# Patient Record
(demographics unavailable — no encounter records)

---

## 2020-10-06 NOTE — REPVR
PROCEDURE INFORMATION: 

Exam: XR Bilateral Hips with Pelvis when Performed 

Exam date and time: 10/6/2020 4:02 PM 

Age: 60 years old 

Clinical indication: Hip pain; Bilateral; Additional info: Hip/pelvis pain S/P 

fall 



TECHNIQUE: 

Imaging protocol: XR bilateral hips with pelvis when performed. 

Views: 2 views. 



COMPARISON: 

CR Spine. Lumbosacral, complete 6/17/2020 7:34 PM 



FINDINGS: 

Bones/joints: No acute fracture seen. No dislocation. 

Soft tissues: There are left groin region surgical clips. 

Vasculature: Phleboliths in the pelvis. 



IMPRESSION: 

No acute fracture seen. 



Electronically signed by: Isaura Park On 10/06/2020  16:26:26 PM

## 2020-10-06 NOTE — REPVR
PROCEDURE INFORMATION: 

Exam: XR Right Knee 

Exam date and time: 10/6/2020 4:02 PM 

Age: 60 years old 

Clinical indication: Pain; Knee; Right; Additional info: Hip/pelvis pain S/P 

fall 



TECHNIQUE: 

Imaging protocol: XR Right knee. 

Views: 4 or more views. 



COMPARISON: 

No relevant prior studies available. 



FINDINGS: 

Bones/joints: Diffuse osseous demineralization. No acute fracture seen. No 

dislocation. Focal, sclerotic lesion in the lateral femoral condyle, most 

likely a bone island. 

Soft tissues: Normal. 



IMPRESSION: 

No acute fracture seen. 



Electronically signed by: Isaura Park On 10/06/2020  16:28:38 PM

## 2020-10-06 NOTE — REPVR
PROCEDURE INFORMATION: 

Exam: US Duplex Right Lower Extremity Veins, Limited 

Exam date and time: 10/6/2020 3:45 PM 

Age: 60 years old 

Clinical indication: Pain; Leg, upper; Right; Additional info: R/O dvt rle 



TECHNIQUE: 

Imaging protocol: Real-time Duplex ultrasound of the Right Lower Extremity with 

2-D gray scale, color Doppler flow and spectral waveform analysis with image 

documentation. Limited exam was focused on the right lower extremity veins. 



COMPARISON: 

No relevant prior studies available. 



FINDINGS: 

Right deep veins: Unremarkable. The common femoral, femoral, proximal profunda 

femoral and popliteal veins are patent without thrombus. Normal Doppler 

waveforms. Normal compressibility and/or augmentation response.  

Right superficial veins: Unremarkable. Saphenofemoral junction is patent 

without thrombus. 



Soft tissues: Unremarkable. 



IMPRESSION: 

No evidence of deep venous thrombus in the right lower extremity. 



Electronically signed by: Isaura Park On 10/06/2020  16:09:28 PM

## 2020-10-06 NOTE — REPVR
PROCEDURE INFORMATION: 

Exam: CT Abdomen And Pelvis With Contrast 

Exam date and time: 10/6/2020 4:09 PM 

Age: 60 years old 

Clinical indication: Abdominal pain; Generalized; Additional info: R/O pe, 

stopped ac, dizziness, lumbar spine ttp 



TECHNIQUE: 

Imaging protocol: Computed tomography of the abdomen and pelvis with 

intravenous contrast. 

Radiation optimization: All CT scans at this facility use at least one of these 

dose optimization techniques: automated exposure control; mA and/or kV 

adjustment per patient size (includes targeted exams where dose is matched to 

clinical indication); or iterative reconstruction. 

Contrast material: ISOVUE 370; Contrast volume: 100 ml; Contrast route: 

INTRAVENOUS (IV);  



COMPARISON: 

CR HIPS BILAT W-AP PELVIS BILAT 10/6/2020 3:39 PM 



FINDINGS: 

Lungs: Areas of subsegmental atelectasis in the lung bases. 



Liver: Normal. No mass. 

Gallbladder and bile ducts: Cholecystectomy clips in the gallbladder fossa. 

Pancreas: Normal. No ductal dilation. 

Spleen: There is a splenule in the left upper quadrant. Limitations assessing 

the spleen due to the contrast phase which is predominantly arterial. The 

spleen appears diffusely heterogeneous. There is an approximate 2.7 x 2.1 cm 

area of hypodensity in the superior aspect of the spleen seen for example on 

sagittal image 89 of series 503; uncertain as to whether this represents 

artifact, a lesion or an area of contusion. 

Adrenals: Normal. No mass. 

Kidneys and ureters: The kidneys demonstrate punctate nonobstructing calculi.  

No hydronephrosis.  

Stomach and bowel: There is colonic diverticulosis without evidence of 

diverticulitis. No evidence of bowel obstruction.  No bowel wall mucosal 

thickening.  Prior partial gastrectomy.

Appendix: A normal appendix is seen. 



Intraperitoneal space: Unremarkable. No free air. No significant fluid 

collection.  Specifically, no evidence of hemoperitoneum with specific 

attention to the left upper quadrant.

Vasculature: Unremarkable. No abdominal aortic aneurysm or dissection. 

Lymph nodes: Unremarkable. No pathologically enlarged lymph nodes. 

Urinary bladder: Unremarkable as visualized. 

Reproductive: Unremarkable as visualized. 

Bones/joints: No acute fracture seen. Several small sclerotic lesions are seen 

in the bony skeleton which are felt to represent bone islands. Moderate 

degenerative disc disease at L4-L5 and L5-S1.  There is right lateral listhesis 

of L4 on L5.

Soft tissues: Unremarkable. 



IMPRESSION: 

Possible approximate 2.7 cm splenic lesion. Limited assessment of the spleen on 

this study due to arterial phase scanning. Recommend follow-up portal venous 

phase imaging. 



Electronically signed by: Isaura Park On 10/06/2020  16:43:51 PM

## 2020-10-06 NOTE — HPEPDOC
General


Date of Admission


Oct 6, 2020 at 19:41


Date of Service:  Oct 7, 2020


Attending Physician:  REN REILLY MD


Chief Complaint


The patient is a 60-year-old female admitted with a reason for visit of Low Back

Pain Muscle Spasm.





History of Present Illness


Pt is a 59 y/o female who presents to ER with cc of a mechanical fall earlier 

today when she lost her  and fell on her buttocks. She denies losing 

consciousness but denies hitting her head. Pt states that she had a mechnical 

fall where she tripped over a speaker system and fell and hit the back of her 

head and has lost consciousness at that time. Since then she's had pain all 

over, pronounced in the head and neck and back and hips region. She states that 

her pain currently is a 5/10. Of note, Pt is a native [place] speaker and is 

also a very poor historian. She denies CP, SOB, abdominal pain, fever, chills, 

n/v/d.





ROS: All 12 points reviewed. See HPI 





PMHx:


Afib was on eliquis but ran out


DVT


CAD


Asthma


gastric bypass


HTN


DM2


hypothyroidism





Social hx:


Moved here 1 year ago.


Son in  


used to smoke but quit at age 40. Smoked less than half pack per day


Denies ETOH or illicit drug use


Dutch speaking 





Labs: see below





Imaging: 


CTA chest  w/ ctx 


IMPRESSION: 


1. No evidence of pulmonary emboli. 


2. The upper abdomen is evaluated separately on a dedicated exam. The 


unopacified superior aspect of the spleen does appear partially hypodense and 


heterogeneous favoring an underlying lesion. 





CT cervical spine


IMPRESSION: 


No cervical spine fracture seen. 





CT head w/o ctx


IMPRESSION: 


No acute intracranial abnormality seen. 





XR b/l hips w/ pelvis 





IMPRESSION: 


No acute fracture seen. 





Knee R XR 





IMPRESSION: 


No acute fracture seen. 








Home Medications


Scheduled


Levothyroxine Sodium (Levothyroxine Sodium) 25 Mcg Tablet, 25 MCG PO QAM, (Repor

yumiko)


Multivitamins (Thera M Plus Tablet) 1 Each Tablet, 1 TAB PO DAILY, (Reported)


Nutritional Supplement/Fiber (Keto Formula) 227 Gm Liquid, 1 CAP PO DAILY, 

(Reported)


Omega-3 Fatty Acids/Fish Oil (Fish Oil 1,000 mg Capsule) 1 Each Capsule, 1,000 

MG PO DAILY, (Reported)





Allergies


Coded Allergies:  


     propoxyphene (Verified  Allergy, Unknown, 3/24/20)





A-FIB/CHADSVASC


A-FIB History


Current/History of A-Fib/PAF?:  Yes


Current PO Anticoag Therapy:  Yes





Physical Examination


General Exam:  Positive: Alert, Cooperative, No Acute Distress


Eye Exam:  Positive: PERRLA, EOMI, Sclera icteric


ENT Exam:  Positive: Atraumatic, Mucous membr. moist/pink, Tongue Midline, Nares

Patent


Neck Exam:  Positive: Supple; 


   Negative: JVD, thyromegaly, Lymphadenopathy


Chest Exam:  Positive: Clear to auscultation, Normal air movement; 


   Negative: Rales, Rhonchi, Wheezing, Diminished


Heart Exam:  Positive: Rate Normal, Regular Rhythm, Normal S1, Normal S2; 


   Negative: Gallops, Murmurs, Rubs


Abdomen Exam:  Positive: Normal bowel sounds, Soft; 


   Negative: Tenderness, Hepatospenomegaly, Mass


Extremity Exam:  Negative: Clubbing, Cyanosis, Edema


Neuro Exam:  Positive: Normal Speech, Strength at 5/5 X4 ext, Sensation Intact, 

Cranial Nerves 3-12 NL, Reflexes 2+





Vital Signs





Vital Signs








  Date Time  Temp Pulse Resp B/P (MAP) Pulse Ox O2 Delivery O2 Flow Rate FiO2


 


10/6/20 16:56  72  140/72 (94)    





  65  155/82 (106)    





  70  150/85 (106)    


 


10/6/20 16:54   18  94 Room Air  


 


10/6/20 13:37 97.6       











Laboratory Data


Labs 24H


Laboratory Tests 2


10/6/20 15:04: 


Urine Opiates Screen NEGATIVE, Urine Methadone Screen NEGATIVE, Urine 

Barbiturates Screen NEGATIVE, Urine Phencyclidine Screen NEGATIVE, Urine 

Amphetamines Screen NEGATIVE, Urine Benzodiazepines Screen NEGATIVE, Urine 

Cocaine Metabolite Screen NEGATIVE, Urine Cannabinoids Screen NEGATIVE


10/6/20 15:06: 


Urine Color YELLOW, Urine Appearance CLEAR, Urine pH 7.0, Urine Specific Gravity

1.030, Urine Protein NEGATIVE, Urine Glucose (UA) NEGATIVE, Urine Ketones 

NEGATIVE, Urine Blood NEGATIVE, Urine Nitrite NEGATIVE, Urine Bilirubin 

NEGATIVE, Urine Urobilinogen 0.2, Urine Leukocyte Esterase NEGATIVE, Urine WBC 

(Auto) 0, Urine RBC (Auto) 1, Urine Hyaline Casts (Auto) 0, Urine Bacteria 

(Auto) NEGATIVE, Urine Squamous Epithelial Cells 1, Urine Sperm (Auto) 


10/6/20 15:07: 


Immature Granulocyte % (Auto) 0.3, Neutrophils (%) (Auto) 46.6, Lymphocytes (%) 

(Auto) 39.2, Monocytes (%) (Auto) 11.8H, Eosinophils (%) (Auto) 1.6, Basophils 

(%) (Auto) 0.5, Neutrophils # (Auto) 1.7, Lymphocytes # (Auto) 1.5, Monocytes # 

(Auto) 0.4, Eosinophils # (Auto) 0.1, Basophils # (Auto) 0.0, Nucleated Red 

Blood Cells % (auto) 0.0, Prothrombin Time 12.7, Prothromb Time International 

Ratio 0.93, Activated Partial Thromboplast Time 30.7, Total Bilirubin 0.7, 

Direct Bilirubin 0.2, Aspartate Amino Transf (AST/SGOT) 20, Alanine 

Aminotransferase (ALT/SGPT) 23, Alkaline Phosphatase 100, Total Creatine Kinase 

85, Creatine Kinase MB 1.1, Creatine Kinase MB Relative Index 1.29, Troponin I <

0.02, Total Protein 8.2, Albumin 3.8, Albumin/Globulin Ratio 0.9L, Amylase Level

38, Lipase 143, Thyroid Stimulating Hormone (TSH) 1.200, Free Thyroxine 0.87


10/6/20 15:21: 


POC Glucose (Misc Panel) 84, POC Sodium (Misc Panel) 142, POC Potassium (Misc 

Panel) 4.3, POC Chloride (Misc Panel) 103, POC Total CO2 (Misc Panel) 27.0, POC 

Blood Urea Nitrogen (Misc Panel 16, POC Ionized Calcium (Misc Panel) 5.1, POC 

Creatinine (Misc Panel) 0.6, POC Hematocrit (Misc Panel) 35.0L


CBC/BMP


Laboratory Tests


10/6/20 15:07











 Assessment/Plan


This is a 59 y/o female admitted for mechanical fall 2x in the last 2 weeks. Her

imaging workup has all been negative. She will be admitted for obs and will get 

PT eval and treatment for weakness and falls.





Plan / VTE


VTE Prophylaxis Ordered?:  Yes





Plan


Plan


#Mechanical fall 


- no LOC


- CT head, cervical spine WNL


- Fall precautions


- PT/OT eval and treat





#Hx of hypothyroidism


- TSH T4 WNL


- Continue home dose synthroid





#HTN


- pt does not have bp meds 


- Current bp 144/84


- started lisinopril 10mg PO qd


- continue to monitor bp closely 





#Hx of afib


- Pt is not currently on eliquis


- started on eliquis 2.5mg PO BID 


- CHADSVASC score 7.1%


- Started on ASA 81mg PO daily 


- Started Atorvastatin 20mg PO daily 





-DM2


- Pt does not take at home insulin or other meds for DM2 


- SSI + FSBS+ hypoglycemic protocol





#Hx of Asthma


- Pt is not on any meds for Asthma


- No current exacerbation of Asthma


- Albuterol inhaler 2puffs q6h PRN 





DVT ppx: eliquis 2.5mg PO BID


GI ppx: none


IVF: encourage PO 


Diet: 2g sodium diet 





Code status: Full code





GME ATTESTATION


GME ATTESTATION


My faculty preceptor for this patient encounter was physically present during 

the encounter and was fully available. All aspects of the patient interview, e

xamination, medical decision making process, and medical care plan development 

were reviewed and approved by the faculty preceptor. The faculty preceptor is 

aware and concurs with the plan as stated in the body of this note and will 

attest to such by his/her cosignature.





ATTENDING NOTE


IMAI, have independently examined this patient and performed my own 

physical exam, as well as reviewed the documentation and edited where necessary.

I have discussed in detail with the resident / student the findings and plan of 

treatment as documented by the resident / student and edited their note. I agree

with their findings and treatment plan and have edited their documentation. I 

will continue to follow the patient during this hospital stay.





Needs good PCP follow up upon discharge and SW to help with obtaining meds. 


PT/OT to work with her due to mechanical falls 


obtain B12/folate for bastric bypass











Krupa Sesay DO                 Oct 6, 2020 20:29


REN REILLY MD               Oct 7, 2020 04:51

## 2020-10-06 NOTE — REPVR
PROCEDURE INFORMATION: 

Exam: CT Head Without Contrast 

Exam date and time: 10/6/2020 2:55 PM 

Age: 60 years old 

Clinical indication: Pain; Headache; Additional info: Fall 



TECHNIQUE: 

Imaging protocol: Computed tomography of the head without contrast. 

Radiation optimization: All CT scans at this facility use at least one of these 

dose optimization techniques: automated exposure control; mA and/or kV 

adjustment per patient size (includes targeted exams where dose is matched to 

clinical indication); or iterative reconstruction. 



COMPARISON: 

No relevant prior studies available. 



FINDINGS: 

Brain: There is mild cerebellar volume loss. No hemorrhage or edema seen. No 

significant white matter disease. 

Cerebral ventricles: No ventriculomegaly. 

Bones/joints: No acute calvarial fracture seen. Degenerative changes of the 

left temporomandibular joint. 

Paranasal sinuses: Visualized sinuses are unremarkable. No fluid levels. 

Mastoid air cells: Visualized mastoid air cells are well aerated. 

Soft tissues: Unremarkable. 



IMPRESSION: 

No acute intracranial abnormality seen. 



Electronically signed by: Isaura Park On 10/06/2020  15:25:07 PM

## 2020-10-06 NOTE — REPVR
PROCEDURE INFORMATION: 

Exam: CT Angiography Chest With Contrast 

Exam date and time: 10/6/2020 4:09 PM 

Age: 60 years old 

Clinical indication: Chest pain; Additional info: R/O pe, stopped ac, dizziness 



TECHNIQUE: 

Imaging protocol: Computed tomographic angiography of the chest with 

intravenous contrast. 

3D rendering (Not supervised by radiologist): MIP and/or 3D reconstructed 

images were created by the technologist. 

Radiation optimization: All CT scans at this facility use at least one of these 

dose optimization techniques: automated exposure control; mA and/or kV 

adjustment per patient size (includes targeted exams where dose is matched to 

clinical indication); or iterative reconstruction. 

Contrast material: ISOVUE 370; Contrast volume: 100 ml; Contrast route: 

INTRAVENOUS (IV);  

Other technique: COMPARISON MORE: CT ABD/PEL W/IV CONTRAST ONLY 10/6/2020 

4:01:50 PM 



COMPARISON: 

CT ANGIO CHEST 3/24/2020 10:20:32 PM



FINDINGS: 

Pulmonary arteries: Normal. No pulmonary emboli. 

Aorta: Unremarkable. No aortic aneurysm. No aortic dissection. 

Veins: There is reflux of contrast into the inferior vena cava. 



Lungs: The lungs demonstrate areas of subsegmental atelectasis versus scarring, 

in particular lateral right middle lobe. No lobar consolidation seen. 

Pleural space: Unremarkable. No pneumothorax. No pleural effusion. 

Heart: There is a prosthetic mitral valve. 

Lymph nodes: No pathologically enlarged lymph nodes. 



Spleen: The upper abdomen is evaluated separately on a dedicated exam. The 

unopacified superior aspect of the spleen does appear partially hypodense and 

heterogeneous favoring an underlying lesion.  This area does appear stable 

compared to the prior CTA chest study.  There are a couple of punctate regional 

coarse parenchymal calcifications.

Stomach and bowel: Prior partial gastrectomy. 

Bones/joints: No acute fracture seen. Mild-to-moderate thoracic degenerative 

disc disease. 

Soft tissues: Unremarkable. 



IMPRESSION: 

1. No evidence of pulmonary emboli. 

2. The upper abdomen is evaluated separately on a dedicated exam. The 

unopacified superior aspect of the spleen does appear partially hypodense and 

heterogeneous favoring an underlying lesion. 



Electronically signed by: Isaura Park On 10/06/2020  16:55:18 PM

## 2020-10-06 NOTE — REPVR
PROCEDURE INFORMATION: 

Exam: CT Cervical Spine Without Contrast 

Exam date and time: 10/6/2020 2:55 PM 

Age: 60 years old 

Clinical indication: Neck pain; Additional info: Fall 



TECHNIQUE: 

Imaging protocol: Computed tomography images of the cervical spine without 

contrast. 

Radiation optimization: All CT scans at this facility use at least one of these 

dose optimization techniques: automated exposure control; mA and/or kV 

adjustment per patient size (includes targeted exams where dose is matched to 

clinical indication); or iterative reconstruction. 



COMPARISON: 

No relevant prior studies available. 



FINDINGS: 

Vertebrae: Slight degenerative retrolisthesis of C4 on C5 and C5 on C6. No 

acute fracture seen. Disc height loss and spondylosis is moderate from C4-C5 

through C6-C7, mild at C7-T1. 

Discs/Spinal canal/Neural foramina: Disc osteophyte complexes causing moderate 

central spinal canal stenosis the at C5-C6, mild central spinal canal stenosis 

at C6-C7. Uncovertebral and facet arthropathy causing multilevel neural 

foraminal stenoses, on the right in particular at C3-C4 and bilaterally at 

C5-C6 and C6-C7. 



Soft tissues: Unremarkable. 

Lungs: Lung apices are normal. 



IMPRESSION: 

No cervical spine fracture seen. 



Electronically signed by: Isaura Park On 10/06/2020  15:20:23 PM

## 2020-10-07 NOTE — ECGEPIP
[facility] - ED

                                       

                                       Test Date:    2020-10-06

Pat Name:     SYL ALCANTAR            Department:   

Patient ID:   [id number]                 Room:         -

Gender:       Female                   Technician:   [email or web address]

:          1960               Requested By: RADHA PERRY PA-C

Order Number: [id number]     Reading MD:   Karla Bagley

                                 Measurements

Intervals                              Axis          

Rate:         59                       P:            46

WV:           180                      QRS:          -5

QRSD:         93                       T:            29

QT:           429                                    

QTc:          427                                    

                           Interpretive Statements

SINUS BRADYCARDIA WITH OCCASIONAL SUPRAVENTRICULAR PREMATURE COMPLEXES

NSTTW abnormalities

DECREASED RATE 20

Electronically Signed on 10-7-2020 8:29:22 EDT by Karla Bagley

## 2020-10-08 NOTE — DS.PDOC
Discharge Summary


General


Date of Admission


Oct 6, 2020 at 19:41


Date of Discharge


10/07/20





Discharge Summary


PROCEDURES PERFORMED DURING STAY: [None].





DISCHARGE DIAGNOSES:


S/p Mechanical fall x 2 with intractable low back pain and muscle spasm


Medication noncompliance





Secondary Diagnosis


Afib was on eliquis but ran out


DVT


CAD


Asthma


H/o Morbid obesity/ Gastric bypass


HTN


DM2 resolved after gastric bypass


Hypothyroidism





COMPLICATIONS/CHIEF COMPLAINT: Low Back Pain Muscle Spasm.





HOSPITAL COURSE: Pt is a 59 y/o female who presents to ER with cc of a 

mechanical fall earlier today when she lost her balance and fell on her 

buttocks. She denies losing consciousness but denies hitting her head. Pt states

 that she had a mechnical fall several days ago where she tripped over a speaker

 system and fell and hit the back of her head and had lost consciousness at that

 time. Since then she's had pain all over, pronounced in the head and neck and 

back and hips region and was having difficulty in ambulating and fell again on 

the day of admission. Patient moved up here about a year ago with his son who is

 active duty and has not seen any PMD yet. She ran out of her medications 1 mo

nth ago. She was admitted for pain control. 





Mechanical fall x 2 with intractable pain and muscle spasm


Now resolved. 


no LOC


CT head, cervical spine WNL


tylenol scheduled helped. 





Hypothyroidism


continue synthroid





HTN


started lisinopril





Paroxysmal A fib


restarted Eliquis





H/O DVT


started on eliquis





Hx of Asthma


Pt is not on any meds for Asthma





DISCHARGE MEDICATIONS: Please see below.


 


ALLERGIES: Please see below.





PHYSICAL EXAMINATION ON DISCHARGE:


VITAL SIGNS: Please see below.


General Exam:  Positive: Alert, Cooperative, No Acute Distress


Eye Exam:  Positive: PERRLA, EOMI, Sclera icteric


ENT Exam:  Positive: Atraumatic, Mucous membr. moist/pink, Tongue Midline, Nares

 Patent


Neck Exam:  Positive: Supple; 


   Negative: JVD, thyromegaly, Lymphadenopathy


Chest Exam:  Positive: Clear to auscultation, Normal air movement; 


   Negative: Rales, Rhonchi, Wheezing, Diminished


Heart Exam:  Positive: Rate Normal, Regular Rhythm, Normal S1, Normal S2; 


   Negative: Gallops, Murmurs, Rubs


Abdomen Exam:  Positive: Normal bowel sounds, Soft; 


   Negative: Tenderness, Hepatosplenomegaly, Mass


Extremity Exam:  Negative: Clubbing, Cyanosis, Edema


Neuro Exam:  Positive: Normal Speech, Strength at 5/5 X4 ext, Sensation Intact, 

Cranial Nerves 3-12 NL, Reflexes 2+





LABORATORY DATA: Please see below.





IMAGING: 


CTA chest  w/ ctx 


IMPRESSION: 


1. No evidence of pulmonary emboli. 


2. The upper abdomen is evaluated separately on a dedicated exam. The 


unopacified superior aspect of the spleen does appear partially hypodense and 


heterogeneous favoring an underlying lesion. 





CT cervical spine


IMPRESSION: 


No cervical spine fracture seen. 





CT head w/o ctx


IMPRESSION: 


No acute intracranial abnormality seen. 





XR b/l hips w/ pelvis 


IMPRESSION: 


No acute fracture seen. 





Knee R XR 





IMPRESSION: 


No acute fracture seen. 





ACTIVITY: [As tolerated].





DIET: As tolerated





DISPOSITION: 01 Home, Self-Care.





DISCHARGE INSTRUCTIONS:


Follow up with new PMD Dr Chavez in 1 week





DISCHARGE CONDITION: [Stable].





TIME SPENT ON DISCHARGE: 35 minutes.





Vital Signs/I&Os





Vital Signs








  Date Time  Temp Pulse Resp B/P (MAP) Pulse Ox O2 Delivery O2 Flow Rate FiO2


 


10/7/20 09:22    138/83    


 


10/7/20 06:00 97.9 59 18  99 Room Air  














I&O- Last 24 Hours up to 6 AM 


 


 10/8/20





 05:59


 


Intake Total 120 ml


 


Balance 120 ml











Discharge Medications


Scheduled


Apixaban (Eliquis) 5 Mg Tablet, 1 TAB PO BID


Levothyroxine Sodium (Levothyroxine Sodium) 25 Mcg Tablet, 25 MCG PO QAM


Lisinopril (Lisinopril) 10 Mg Tablet, 10 MG PO DAILY


Omega-3 Fatty Acids/Fish Oil (Fish Oil 1,000 mg Capsule) 1 Each Capsule, 1,000 

MG PO DAILY





Scheduled PRN


Acetaminophen (Acetaminophen) 500 Mg Tablet, 1,000 MG PO BIDP PRN for PAIN





Allergies


Coded Allergies:  


     propoxyphene (Verified  Allergy, Unknown, 3/24/20)











MASON LE MD                    Oct 8, 2020 12:40

## 2021-03-09 NOTE — ECWPNPC
PATIENT NAME: SYL ALCANTAR

: 1960

GENDER: FEMALE

MRN: [id number]

VISIT DATE: 2021

DISCHARGE DATE: 21 0958

VISIT LOCKED DATE TIME: 

PHYSICIAN: PAOLA WILKES 

PHYSICIAN PAGER NO: ACTIVE

RESOURCE: PAOLA WILKES 

 

           

           

REASON FOR APPOINTMENT

           

          1. LOW BACK

           

HISTORY OF PRESENT ILLNESS

           

      GENERAL:

           61-YEAR-OLD FEMALE IN FOR INITIAL PAIN CONSULT. SHE RATES HER

          PAIN CURRENTLY AT A 9 OUT OF 10 AND DESCRIBES IT AS BURNING,

          INTERMITTENT, SHARP, STABBING, AND CRAMPING. THE PAIN HAS BEEN

          PRESENT FOR SEVERAL YEARS. PATIENT FEELS MEDICATIONS ARE HELPFUL.

           

      FALL RISK SCREENING:

      SCREENING

          : ONE FALL WITH INJURY IN THE PAST YEAR PATIENT STATES SHE WAS

          SEEN AT [facility] AND TREATED FOR A FALL IN 2020..

           

      PAIN SCREENING:

      PATIENT HAS A COMPLAINT OF ACUTE OR CHRONIC PAIN

           

           

          :YES

          LOCATION OF PAIN:LOW BACK, LEG(S)

          INTENSITY OF PAIN (SCALE OF 1 TO 10):9

          WHAT DOES YOUR PAIN FEEL LIKE:BURNING, INTERMITTENT, SHARP,

          STABBING, OTHER CRAMPING

          DURATION:STEADY, INTERMITTENT

          PAIN IS INCREASED BY:ACTIVITIES, PROLONGED STANDING

          PAIN IS DECREASED BY:USE OF PAIN MEDICATIONS, OTHERS

           

      NURSING NOTE:

           - - -.

           

      PAIN CENTER INTAKE QUESTIONS:

      DO YOU HAVE A HISTORY OF MRSA?

           

           

          :NO

           

      DO YOU TAKE A BLOOD THINNERS?

           

           

          :YES ELOQUIS

           

      DO YOU HAVE ANY BLEEDING DISORDERS?

           

           

          :YES SICKLE CELL ANEMIA

           

      ANY NEW NUMBNESS OR WEAKNESS IN YOUR LEGS OR ARMS?

           

           

          :YES LEFT SIDED WEAKNESS

           

      ANY PACEMAKER,DEFIBRILLATOR, OR DORSAL COLUMN

          STIMULATOR?

           

           

          :NO

           

      DO YOU HAVE ANY RASHES OR OPEN SORES?

           

           

          :NO

           

      ARE YOU ALLERGIC TO IV DYE?

           

           

          :NO

           

      ARE YOU DIABETIC?

           

           

          :YES TYPE II

           

      ANY NEW PROBLEMS WITH YOUR MEDICATIONS?

           

           

          :NO

           

      HAVE YOU RECEIVED A VACCINE IN THE PAST 30 DAYS?

           

           

          :NO

           

      DO YOU PLAN TO RECEIVE A VACCINE IN THE NEXT 21

          DAYS?

           

           

          :YES

          IF SO WHAT VACCINE AND WHEN?  SCHEDULED FOR 1ST

          COVID VACCINATION.

           

      DO YOU NEED ANY PRESCRIPTION?

           

           

          :NO

           

      DO YOU TAKE ANY IMMUNOSUPPRESSIVE MEDICATIONS?

           

           

          :NO

           

      DO YOU HAVE ANY KIDNEY OR LIVER DISEASE?

           

           

          :NO

           

      IS THERE A CHANCE YOU COULD BE PREGNANT?

           

           

          :NO

           

      ARE YOU BREAST FEEDING?

           

           

          :NO

           

CURRENT MEDICATIONS

           

          TAKING OMEPRAZOLE 40 MG CAPSULE DELAYED RELEASE 1 CAPSULE 30

          MINUTES BEFORE MORNING MEAL ORALLY ONCE A DAY

          TAKING AMOXICILLIN-POT CLAVULANATE 875-125 MG TABLET 1 TABLET

          ORALLY EVERY 12 HRS

          TAKING BUSPIRONE HCL 5 MG TABLET 1 TABLET ORALLY TWICE A DAY

          TAKING LEVOFLOXACIN 750 MG TABLET 1 TABLET ORALLY ONCE A DAY,

          NOTES: UNSURE OF DOSAGE

          TAKING PERCOCET 5-325 MG TABLET 1 TABLET AS NEEDED ORALLY EVERY 6

          HRS

          TAKING FLUOXETINE HCL (PMDD) 20 MG TABLET 1 TABLET ORALLY ONCE A

          DAY

          TAKING OLANZAPINE 5 MG TABLET 1 TABLET ORALLY ONCE A DAY

          TAKING ALPRAZOLAM 1 MG TABLET 1 TABLET AS NEEDED FOR ANXIETY

          ORALLY ONCE A DAY

          TAKING ELIQUIS 5 MG TABLET 1 TABLET ORALLY TWICE A DAY

          TAKING CARVEDILOL 3.125 MG TABLET 1 TABLET WITH FOOD ORALLY TWICE

          A DAY

          TAKING CLOPIDOGREL BISULFATE 75 MG TABLET 1 TABLET ORALLY ONCE A

          DAY

          TAKING VITAMIN D3 25 MCG (1000 UT) TABLET 1 TABLET ORALLY ONCE A

          DAY

          TAKING LINZESS 145 MCG CAPSULE 1 CAPSULE AT LEAST 30 MINUTES

          BEFORE THE FIRST MEAL OF THE DAY ON AN EMPTY STOMACH ORALLY ONCE

          A DAY

          NOT-TAKING PERCOCET 5-325 MG TABLET 1 TABLET AS NEEDED FOR LOWER

          BACK PAIN ORALLY EVERY 12 HRS

          NOT-TAKING NITROGLYCERIN 0.4 MG TABLET SUBLINGUAL AS DIRECTED

          SUBLINGUAL

          NOT-TAKING LEVOTHYROXINE SODIUM 25 MCG TABLET 1 TABLET IN THE

          MORNING ON AN EMPTY STOMACH ORALLY ONCE A DAY

          MEDICATION LIST REVIEWED AND RECONCILED WITH THE PATIENT

           

PAST MEDICAL HISTORY

           

          LOW BACK PAIN

          MITRAL VALVE

          GASTRIC BYPASS 

          UMBILICAL HERNIA 

          CHOLECYCSTITIS 

          SICKLE CELL ANEMIA

          ASTHMA

          DIABETES MELLITUS TYPE II

           

ALLERGIES

           

          DARVON: HEART PALPATATIONS - SIDE EFFECTS

           

SURGICAL HISTORY

           

          MITRAL VALVE

           

SOCIAL HISTORY

           

          GENERAL:

           

          TOBACCO USE

          ARE YOU A:FORMER SMOKER

           

           

          LATEX QUESTIONNAIRE

          LATEX ALLERGY : HAVE YOU EVER DEVELOPED ANY TYPE OF REACTION

          AFTER HANDLING LATEX PRODUCTS SUCH AS RUBBER GLOVES, CONDOMS,

          DIAPHRAGMS, BALLOONS, SOCKS, OR UNDERWEAR?NO

          LATEX ALLERGY : HAVE YOU EVER DEVELOPED ANY TYPE OF REACTION

          DURING OR AFTER DENTAL APPOINTMENT, VAGINAL/RECTAL EXAMINATION,

          SURGICAL PROCEDURE, OR ANY OTHER EXPOSURE?NO

          LATEX RISK : HAVE YOU EVER HAD ANY DIFFICULTY BREATHING OR HIVES

          AFTER EATING OR HANDLING ANY FRUITS, OR VEGETABLES; SUCH AS KIWI,

          BANANAS, STONE FRUITS, OR CHESTNUTSNO

          LATEX RISK : DO YOU HAVE A PREVIOUS PERSONAL HISTORY OF MORE THAN

          NINE SURGERIES, SPINA BIFIDA, OR REPEATED CATHERIZATIONS? NO

          LATEX RISK : ARE YOU FREQUENTLY EXPOSED TO LATEX PRODUCTS IN YOUR

          OCCUPATION?NO

          DATE ASKED : 2021

           

           

          ALCOHOL USE: NO.

           

           

          ALCOHOL SCREENING

          DID YOU HAVE A DRINK CONTAINING ALCOHOL IN THE PAST YEAR?NO

          POINTS0

          INTERPRETATIONNEGATIVE

           

           

          RECREATIONAL DRUG USE

          DRUG USE?NO

           

           

          CAFFEINE

          CAFFEINE USE?NO

           

           

          LANGUAGE

          LANGUAGES SPOKEN:Macedonian ENGLISH

           

           

          LEARNING BARRIERS / SPECIAL NEEDS

          BARRIERS TO LEARNING?NO

          HEARING IMPAIRED?YES

          VISION IMPAIRED?YES

          :CORRECTIVE LENSES

          COGNITIVELY IMPAIRED?NO

          READINESS TO LEARN?YES

          LEARNING PREFERENCES?NO

          LEARNING CAPABILITIES PRESENT?YES

          EMOTIONAL BARRIERS?NO

          SPECIAL DEVICES?NO

           NEEDED?YES

           

HOSPITALIZATION/MAJOR DIAGNOSTIC PROCEDURE

           

          SURGERY

          HTN/[facility] 2020

          STROKE 1998

           

REVIEW OF SYSTEMS

           

      CONSTITUTIONAL:

           

          ANY RECENT FEVER    NO . CHILLS    NO . WEIGHT CHANGE OF UNKNOWN

          REASONS    NO .

           

      MUSCULOSKELETAL:

           

          ANY UNUSUAL JOINT PAIN OR SWELLING NOT MENTIONED    NO . SYSTEMIC

          LUPUS    NO . ANY NEUROMUSCULAR DISORDER NOT MENTIONED    NO .

          LYME DISEASE    NO .

           

      GASTROENTEROLOGY:

           

          ANY NEW CHANGE IN BOWEL CONTROL?    NO . HISTORY OF LIVER

          DISORDER NOT MENTIONED    NO . HISTORY OF UNUSUAL ABDOMINAL PAIN

          OR CRAMPING NOT MENTIONED    NO . NO CONSTIPATION.

           

      GENITOURINARY:

           

          ANY NEW CHANGE IN BLADDER CONTROL?    NO . ANY RENAL/KIDNEY

          CONDITON NOT MENTIONED    NO .

           

      NEUROLOGY:

           

          HISTORY OF TBI NOT MENTIONED    NO . OTHER NEW NUMBNESS OR PAIN

          PATTERNS NOT MENTIONED    NO . NEW ONSET DIZZINESS OR

          NEUROLOGICAL CHANGES NOT MENTIONED    NO . HISTORY OF SEVERE

          HEADACHES NOT MENTIONED    NO . HISTORY OF STROKE OR NEUROLOGICAL

          DISORDER NOT MENTIONED    NO .

           

      CARDIOLOGY:

           

          HEART SURGERY    NO . CONGESTIVE HEART FAILURE/FLUID OVERLOAD NOT

          MENTIONED    NO . HISTORY OF CHEST PAIN,IRREGULAR HEART BEAT NOT

          MENTIONED    NO .

           

      RESPIRATORY:

           

          SHORTNESS OF BREATH ON EXERTION, WHEEZES, UNUSUAL COUGH NOT

          MENTIONED    NO .

           

      ENDOCRINOLOGY:

           

          ADRENAL GLAND OR THYROID DISORDERS NOT MENTIONED    NO . UNUSUAL

          URINATION, DIZZINESS OR LETHARGY NOT MENTIONED    NO .

           

VITAL SIGNS

           

          .4 LBS, HT 67 IN, BMI 31.07 INDEX, /72 MM HG, HR 65

          /MIN, RR 18 /MIN, TEMP 98.0 F, OXYGEN SAT % 100%, SAFE IN ENV?

          (Y/N) YES, NA INITIALS SC 08:39, REVIEWED BY: YUE QUAN MA.

           

EXAMINATION

           

      GENERAL EXAMINATION:

          GENERALNO ACUTE DISTRESS, WELL NOURISHED AND HYDRATED.

           

          PSYCHAPPROPRIATE MOOD AND AFFECT .

           

          LUNGS:CLEAR TO AUSCULTATION BILATERALLY, NO WHEEZES, RHONCHI,

          RALES.

           

          HEART:NO MURMURS, REGULAR RATE AND RHYTHM.

           

          BACK:POINT TENDER BILATERAL LUMBAR SPINE, STARTING SKIN SHOWS NO

          ERYTHEMA, ECCHYMOSIS, INCREASED WARMTH, AND/OR SKIN ERUPTIONS

          NOTED. .

           

ASSESSMENTS

           

          LOW BACK PAIN, UNSPECIFIED BACK PAIN LATERALITY, UNSPECIFIED

          CHRONICITY, UNSPECIFIED WHETHER SCIATICA PRESENT - M54.5

          (PRIMARY)

           

          LONG TERM (CURRENT) USE OF OPIATE ANALGESIC - Z79.891

           

TREATMENT

           

      LOW BACK PAIN, UNSPECIFIED BACK PAIN LATERALITY,

          UNSPECIFIED CHRONICITY, UNSPECIFIED WHETHER SCIATICA PRESENT

          START OXYCODONE HCL TABLET, 5 MG, 1 TABLET AS NEEDED, ORALLY,

          EVERY 12 HRS AS NEEDED MDD 2, 30 DAYS, 60

          LAB: URINE TEST GROUP

           

          SURINDER QUAN 3/4/2021 9:46:58 AM > LAST DOSE PERCOCET 2021

           

           

          [facility] MRI SPINE, [facility] WITHOUT [id number]

          NOTES: 61-YEAR-OLD FEMALE IN FOR INITIAL PAIN CONSULT. GIVEN

          PRESENTING SYMPTOMS AND RESULTS PHYSICAL EXAMINATION RECOMMEND

          GETTING AN UPDATED MRI AND STARTING MEDICATIONS WITH FOLLOW-UP

          STATUS POST MRI. PATIENT HAS EXPRESSED UNDERSTANDING OF AND WAS

          IN AGREEMENT WITH TREATMENT PLAN. GIVEN TIME TO ASK QUESTIONS AND

          EXPRESS CONCERNS.

           

PROCEDURE CODES

           

           ESTABILISHED PATIENT [facility] CHARGE

           

DISPOSITION & COMMUNICATION

           

FOLLOW UP

           

          POST IMAGING (REASON: OPEN MRI LUMBAR SPINE W/O CONTRAST )

           

 

ELECTRONICALLY SIGNED BY MONA EM ON

          2021 AT 02:00 PM EST

           

           

           

 

DISCLAIMER :

THIS IS A VISIT SUMMARY EXTRACTED FROM THE [employer] CHART.

IT IS NOT A COPY OF THE [employer] PROGRESS NOTE.

LUCY

## 2021-03-18 NOTE — REP
INDICATION:

HERNIA.



COMPARISON:

10/06/2020



TECHNIQUE:

Axial contrast-enhanced images from the lung bases to the pubic symphysis using oral

and 100 cc Isovue 370 intravenous contrast material.  Coronal and sagittal

reformations obtained.



This CT examination was performed using the following dose reduction techniques:

Automated exposure control, adjustment of mA and/or kv according to the patient's

size, and the use of iterative reconstruction technique.



FINDINGS:

Liver, spleen, pancreas, bilateral adrenal glands and kidneys are normal.



Evidence for prior gastric bypass surgery and cholecystectomy.



The enteric system including stomach, small, and large bowel appears normal.  No

evidence for obstruction or acute inflammatory process.  Normal terminal ileum and

appendix are identified in the right lower quadrant.  Colonic and sigmoid

diverticulosis noted without acute diverticulitis.



A small fat containing ventral/periumbilical hernia is suggested measuring less than 2

cm.



Pelvis demonstrates normal bladder and age-appropriate uterus/adnexa.



No ascites.  No free air.  No intraperitoneal or retroperitoneal adenopathy.

Abdominal aorta and vasculature appear normal.  Musculoskeletal structures are intact

and without acute osseous abnormality.



IMPRESSION:

No acute abdominopelvic pathology appreciated.

Diverticulosis without acute diverticulitis.

Small subtle midline periumbilical hernia measuring less than 2 cm.





<Electronically signed by Nikhil Sharp > 03/18/21 0816

## 2021-04-10 NOTE — ECWPNPC
PATIENT NAME: SYL ALACNTAR

: 1960

GENDER: FEMALE

MRN: [id number]

VISIT DATE: 2021

DISCHARGE DATE: 21 1137

VISIT LOCKED DATE TIME: 

PHYSICIAN: PAOLA WILKES 

PHYSICIAN PAGER NO: ACTIVE

RESOURCE: PAOLA WILKES 

 

           

           

REASON FOR APPOINTMENT

           

          1. MRI REVIEW

           

HISTORY OF PRESENT ILLNESS

           

      DEPRESSION SCREENING:

      PHQ-9

           

           

          LITTLE INTEREST OR PLEASURE IN DOING THINGSNEARLY EVERY DAY

          FEELING DOWN, DEPRESSED, OR HOPELESSSEVERAL DAYS

          TROUBLE FALLING OR STAYING ASLEEP, OR SLEEPING TOO MUCHNEARLY

          EVERY DAY

          FEELING TIRED OR HAVING LITTLE ENERGYNEARLY EVERY DAY

          POOR APPETITE OR OVEREATING NEARLY EVERY DAY

          FEELING BAD ABOUT YOURSELF-OR THAT YOU ARE A FAILURE OR HAVE LET

          YOURSELF OR YOUR FAMILY DOWN NEARLY EVERY DAY

          TROUBLE CONCENTRATING ON THINGS, SUCH AS READING THE NEWSPAPER OR

          WATCHING TELEVISION NEARLY EVERY DAY

          MOVING OR SPEAKING SO SLOWLY THAT OTHER PEOPLE COULD HAVE

          NOTICED. OR THE OPPOSITE- BEING SO FIDGETY OR RESTLESS THAT YOU

          HAVE BEEN MOVING AROUND A LOT MORE THAN USUALNOT AT ALL

          THOUGHTS THAT YOU WOULD BE BETTER OFF DEAD, OR OF HURTING

          YOURSELF IN SOME WAY?NOT AT ALL

          TOTAL SCORE:19

          INTERPRETATIONMODERATELY SEVERE DEPRESSION

           

      PHQ-2 (2015 EDITION)

           

           

          LITTLE INTEREST OR PLEASURE IN DOING THINGS?NEARLY EVERY DAY

          FEELING DOWN, DEPRESSED, OR HOPELESS?NEARLY EVERY DAY

          TOTAL SCORE6

           

           61-YEAR-OLD FEMALE IN FOR CHRONIC PAIN FOLLOW-UP. SHE RATES HER

          PAIN CURRENTLY AT AN 8 OUT OF 10. PATIENT WAS STARTED ON

          OXYCODONE AT LAST CLINIC VISIT AND IT WAS NOTED TODAY THAT SHE

          HAD BEEN OVERTAKING HER MEDICATION.

           

      GENERAL:

           -.

           

      FALL RISK SCREENING:

      SCREENING

          : NO FALLS REPORTED IN THE LAST YEAR.

           

      PAIN SCREENING:

      PATIENT HAS A COMPLAINT OF ACUTE OR CHRONIC PAIN

           

           

          :YES

          LOCATION OF PAIN:HEAD, LOW BACK, LEG(S)

          INTENSITY OF PAIN (SCALE OF 1 TO 10):8

          WHAT DOES YOUR PAIN FEEL LIKE:ACHING, SHARP, STABBING, THROBBING,

          SHOOTING

          DURATION:CONTINOUS, CONSTANT

          PAIN IS INCREASED BY:ACTIVITIES

          PAIN IS DECREASED BY:USE OF PAIN MEDICATIONS

           

      NURSING NOTE:

           -.

           

      PAIN CENTER INTAKE QUESTIONS:

      DO YOU HAVE A HISTORY OF MRSA?

           

           

          :NO

           

      DO YOU TAKE A BLOOD THINNERS?

           

           

          :NO

           

      DO YOU HAVE ANY BLEEDING DISORDERS?

           

           

          :NO

           

      ANY NEW NUMBNESS OR WEAKNESS IN YOUR LEGS OR ARMS?

           

           

          :YES LEFT LEG AND LEFT HAND

           

      ANY PACEMAKER,DEFIBRILLATOR, OR DORSAL COLUMN

          STIMULATOR?

           

           

          :NO

           

      DO YOU HAVE ANY RASHES OR OPEN SORES?

           

           

          :NO

           

      ARE YOU ALLERGIC TO IV DYE?

           

           

          :NO

           

      ARE YOU DIABETIC?

           

           

          :YES

           

      ANY NEW PROBLEMS WITH YOUR MEDICATIONS?

           

           

          :YES PT TAKING OXYCODONE 2 TABS TID FOR IT TO WORK

           

      HAVE YOU RECEIVED A VACCINE IN THE PAST 30 DAYS?

           

           

          :NO

           

      DO YOU PLAN TO RECEIVE A VACCINE IN THE NEXT 21

          DAYS?

           

           

          :YES

          IF SO WHAT VACCINE AND WHEN? COVID VACCINE NEXT MONTH

           

      DO YOU NEED ANY PRESCRIPTION?

           

           

          :YES PT ASKING FOR PERCOSET 5/325

           

      DO YOU TAKE ANY IMMUNOSUPPRESSIVE MEDICATIONS?

           

           

          :NO

           

      DO YOU HAVE ANY KIDNEY OR LIVER DISEASE?

           

           

          :NO

           

      IS THERE A CHANCE YOU COULD BE PREGNANT?

           

           

          :NO

           

      ARE YOU BREAST FEEDING?

           

           

          :NO

           

CURRENT MEDICATIONS

           

          TAKING ALPRAZOLAM 1 MG TABLET 1 TABLET AS NEEDED FOR ANXIETY

          ORALLY ONCE A DAY

          TAKING VITAMIN D3 25 MCG (1000 UT) TABLET 1 TABLET ORALLY ONCE A

          DAY

          TAKING LINZESS 145 MCG CAPSULE 1 CAPSULE AT LEAST 30 MINUTES

          BEFORE THE FIRST MEAL OF THE DAY ON AN EMPTY STOMACH ORALLY ONCE

          A DAY

          TAKING OXYCODONE HCL 5 MG TABLET 1 TABLET AS NEEDED ORALLY EVERY

          12 HRS AS NEEDED MDD 2

          TAKING OLANZAPINE 5 MG TABLET 1 TABLET ORALLY ONCE A DAY

          NOT-TAKING OMEPRAZOLE 40 MG CAPSULE DELAYED RELEASE 1 CAPSULE 30

          MINUTES BEFORE MORNING MEAL ORALLY ONCE A DAY

          NOT-TAKING AMOXICILLIN-POT CLAVULANATE 875-125 MG TABLET 1 TABLET

          ORALLY EVERY 12 HRS

          NOT-TAKING BUSPIRONE HCL 5 MG TABLET 1 TABLET ORALLY TWICE A DAY

          NOT-TAKING LEVOFLOXACIN 750 MG TABLET 1 TABLET ORALLY ONCE A DAY,

          NOTES: UNSURE OF DOSAGE

          NOT-TAKING PERCOCET 5-325 MG TABLET 1 TABLET AS NEEDED ORALLY

          EVERY 6 HRS

          NOT-TAKING FLUOXETINE HCL (PMDD) 20 MG TABLET 1 TABLET ORALLY

          ONCE A DAY

          NOT-TAKING ELIQUIS 5 MG TABLET 1 TABLET ORALLY TWICE A DAY

          NOT-TAKING CLOPIDOGREL BISULFATE 75 MG TABLET 1 TABLET ORALLY

          ONCE A DAY

          NOT-TAKING CARVEDILOL 3.125 MG TABLET 1 TABLET WITH FOOD ORALLY

          TWICE A DAY

          NOT-TAKING PERCOCET 5-325 MG TABLET 1 TABLET AS NEEDED FOR LOWER

          BACK PAIN ORALLY EVERY 12 HRS

          NOT-TAKING NITROGLYCERIN 0.4 MG TABLET SUBLINGUAL AS DIRECTED

          SUBLINGUAL

          NOT-TAKING LEVOTHYROXINE SODIUM 25 MCG TABLET 1 TABLET IN THE

          MORNING ON AN EMPTY STOMACH ORALLY ONCE A DAY

          MEDICATION LIST REVIEWED AND RECONCILED WITH THE PATIENT

           

PAST MEDICAL HISTORY

           

          LOW BACK PAIN

          MITRAL VALVE

          GASTRIC BYPASS 

          UMBILICAL HERNIA 2015

          CHOLECYCSTITIS 

          SICKLE CELL ANEMIA

          ASTHMA

          DIABETES MELLITUS TYPE II

           

ALLERGIES

           

          DARVON: HEART PALPATATIONS - SIDE EFFECTS

           

SOCIAL HISTORY

           

          GENERAL:

           

          TOBACCO USE

          ARE YOU A:FORMER SMOKER

           

           

          LATEX QUESTIONNAIRE

          LATEX ALLERGY : HAVE YOU EVER DEVELOPED ANY TYPE OF REACTION

          AFTER HANDLING LATEX PRODUCTS SUCH AS RUBBER GLOVES, CONDOMS,

          DIAPHRAGMS, BALLOONS, SOCKS, OR UNDERWEAR?NO

          LATEX ALLERGY : HAVE YOU EVER DEVELOPED ANY TYPE OF REACTION

          DURING OR AFTER DENTAL APPOINTMENT, VAGINAL/RECTAL EXAMINATION,

          SURGICAL PROCEDURE, OR ANY OTHER EXPOSURE?NO

          DATE ASKED : 2021

          LATEX RISK : HAVE YOU EVER HAD ANY DIFFICULTY BREATHING OR HIVES

          AFTER EATING OR HANDLING ANY FRUITS, OR VEGETABLES; SUCH AS KIWI,

          BANANAS, STONE FRUITS, OR CHESTNUTSNO

          LATEX RISK : DO YOU HAVE A PREVIOUS PERSONAL HISTORY OF MORE THAN

          NINE SURGERIES, SPINA BIFIDA, OR REPEATED CATHERIZATIONS? NO

          LATEX RISK : ARE YOU FREQUENTLY EXPOSED TO LATEX PRODUCTS IN YOUR

          OCCUPATION?NO

           

           

          ALCOHOL USE: NO.

           

           

          ALCOHOL SCREENING

          DID YOU HAVE A DRINK CONTAINING ALCOHOL IN THE PAST YEAR?NO

          POINTS0

          INTERPRETATIONNEGATIVE

           

           

          RECREATIONAL DRUG USE

          DRUG USE?NO

           

           

          CAFFEINE

          CAFFEINE USE?NO

           

           

          LANGUAGE

          LANGUAGES SPOKEN:Amharic ENGLISH

           

           

          LEARNING BARRIERS / SPECIAL NEEDS

          BARRIERS TO LEARNING?NO

          HEARING IMPAIRED?YES

          VISION IMPAIRED?YES

          COGNITIVELY IMPAIRED?NO

          :CORRECTIVE LENSES

          READINESS TO LEARN?YES

          LEARNING PREFERENCES?NO

          LEARNING CAPABILITIES PRESENT?YES

          EMOTIONAL BARRIERS?NO

          SPECIAL DEVICES?NO

           NEEDED?YES

           

REVIEW OF SYSTEMS

           

      CONSTITUTIONAL:

           

          ANY RECENT FEVER    NO . CHILLS    NO . WEIGHT CHANGE OF UNKNOWN

          REASONS    NO .

           

      GASTROENTEROLOGY:

           

          NEW UNEXPLAINABLE CHANGES IN BOWEL CONTROL    NO . CONSTIPATION  

           NO .

           

      GENITOURINARY:

           

          ANY NEW CHANGE IN BLADDER CONTROL?    NO .

           

      NEUROLOGY:

           

          NEW ONSET DIZZINESS OR NEUROLOGICAL CHANGES NOT MENTIONED    NO .

          NEW NUMBNESS OR PAIN PATTERNS NOT MENTIONED AND PERTINENT TO

          TODAY'S VISIT    NO .

           

      CARDIOLOGY:

           

          NEW CHEST PRESSURE    NO . PATIENT DENIES    NO .

           

      RESPIRATORY:

           

          UNEXPLAINABLE COUGH    NO . NEW SHORTNESS OF BREATH    NO .

           

VITAL SIGNS

           

          .6 LBS, HT 67 IN, BMI 30.63 INDEX, /77 MM HG, HR 72

          /MIN, RR 18 /MIN, TEMP 97.4 F, OXYGEN SAT % 98%, SAFE IN ENV?

          (Y/N) Y, NA INITIALS SC 10:57, REVIEWED BY: EM.

           

EXAMINATION

           

      GENERAL EXAMINATION:

          GENERALNO ACUTE DISTRESS, WELL NOURISHED AND HYDRATED.

           

          PSYCHAPPROPRIATE MOOD AND AFFECT .

           

          LUNGS:CLEAR TO AUSCULTATION BILATERALLY, NO WHEEZES, RHONCHI,

          RALES.

           

          HEART:NO MURMURS, REGULAR RATE AND RHYTHM.

           

ASSESSMENTS

           

          LOW BACK PAIN, UNSPECIFIED BACK PAIN LATERALITY, UNSPECIFIED

          CHRONICITY, UNSPECIFIED WHETHER SCIATICA PRESENT - M54.5

          (PRIMARY)

           

TREATMENT

           

      LOW BACK PAIN, UNSPECIFIED BACK PAIN LATERALITY,

          UNSPECIFIED CHRONICITY, UNSPECIFIED WHETHER SCIATICA PRESENT

          REFILL OXYCODONE HCL TABLET, 5 MG, 1 TABLET AS NEEDED, ORALLY,

          EVERY 8 HRS AS NEEDED MDD 3, 30 DAY(S), 90

          NOTES: 61-YEAR-OLD FEMALE IN FOR CHRONIC PAIN FOLLOW-UP. GIVEN

          PRESENTING SYMPTOMS RECOMMEND INCREASING OXYCODONE 5 MG 3 TIMES A

          DAY WITH FOLLOW-UP IN ONE MONTH. DISCUSSED OVERTAKING MEDICATION

          WITH PATIENT AND SHE WAS INFORMED THAT WAS A VIOLATION OF HER

          NARCOTIC AGREEMENT AND THAT GOING FORWARD THAT SHE CANNOT

          OVERTAKE HER MEDICATION. PATIENT EXPRESSED UNDERSTANDING OF AND

          WAS IN AGREEMENT WITH TREATMENT PLAN. GIVEN TIME TO ASK QUESTIONS

          AND EXPRESS CONCERNS.

           

PROCEDURE CODES

           

           ESTABILISHED PATIENT [facility] CHARGE

           

DISPOSITION & COMMUNICATION

           

FOLLOW UP

           

          4 WEEKS (REASON: LOW BACK PAIN )

           

 

ELECTRONICALLY SIGNED BY MONA EM ON

          2021 AT 01:05 PM EDT

           

           

           

 

DISCLAIMER :

THIS IS A VISIT SUMMARY EXTRACTED FROM THE [employer] CHART.

IT IS NOT A COPY OF THE [employer] PROGRESS NOTE.

LUCY

## 2021-05-08 NOTE — ECWPNPC
PATIENT NAME: SYL ALCANTAR

: 1960

GENDER: FEMALE

MRN: [id number]

VISIT DATE: 2021

DISCHARGE DATE: 21 1210

VISIT LOCKED DATE TIME: 

PHYSICIAN: PAOLA WILKES 

PHYSICIAN PAGER NO: ACTIVE

RESOURCE: PAOLA WILKES 

 

           

           

REASON FOR APPOINTMENT

           

          1. LOW BACK PAIN

           

HISTORY OF PRESENT ILLNESS

           

      GENERAL:

      HPI

          61-YEAR-OLD FEMALE IN FOR CHRONIC PAIN FOLLOW-UP. AT LAST CLINIC

          VISIT PATIENT'S OXYCODONE WAS INCREASED TO 3 TIMES A DAY DOSING.

          PATIENT DOES ADMIT TO INCREASED PAIN IN HER LEGS AT NIGHTTIME..

           

ALLERGIES

           

          NO[ALLERGIES VERIFIED]

           

REVIEW OF SYSTEMS

           

      CONSTITUTIONAL:

           

          ANY RECENT FEVER    NO . CHILLS    NO . WEIGHT CHANGE OF UNKNOWN

          REASONS    NO .

           

      GASTROENTEROLOGY:

           

          NEW UNEXPLAINABLE CHANGES IN BOWEL CONTROL    NO . CONSTIPATION  

           NO .

           

      GENITOURINARY:

           

          ANY NEW CHANGE IN BLADDER CONTROL?    NO .

           

      NEUROLOGY:

           

          NEW ONSET DIZZINESS OR NEUROLOGICAL CHANGES NOT MENTIONED    NO .

          NEW NUMBNESS OR PAIN PATTERNS NOT MENTIONED AND PERTINENT TO

          TODAY'S VISIT    NO .

           

      CARDIOLOGY:

           

          NEW CHEST PRESSURE    NO . PATIENT DENIES    NO .

           

      RESPIRATORY:

           

          UNEXPLAINABLE COUGH    NO . NEW SHORTNESS OF BREATH    NO .

           

VITAL SIGNS

           

           LBS, HT 67 IN, BMI 30.22 INDEX, /83 MM HG, HR 73

          /MIN, RR 18 /MIN, TEMP 98.0 F, OXYGEN SAT % 100%, NA INITIALS SC

          11:47.

           

EXAMINATION

           

      GENERAL EXAMINATION:

          GENERALNO ACUTE DISTRESS, WELL NOURISHED AND HYDRATED.

           

          PSYCHAPPROPRIATE MOOD AND AFFECT .

           

          LUNGS:CLEAR TO AUSCULTATION BILATERALLY, NO WHEEZES, RHONCHI,

          RALES.

           

          HEART:NO MURMURS, REGULAR RATE AND RHYTHM.

           

ASSESSMENTS

           

          LOW BACK PAIN, UNSPECIFIED BACK PAIN LATERALITY, UNSPECIFIED

          CHRONICITY, UNSPECIFIED WHETHER SCIATICA PRESENT - M54.5

          (PRIMARY)

           

          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

           

TREATMENT

           

      LOW BACK PAIN, UNSPECIFIED BACK PAIN LATERALITY,

          UNSPECIFIED CHRONICITY, UNSPECIFIED WHETHER SCIATICA PRESENT

          REFILL OXYCODONE HCL TABLET, 5 MG, 1 TABLET AS NEEDED, ORALLY,

          EVERY 8 HRS AS NEEDED MDD 3, 30 DAY(S), 90

          START LYRICA CAPSULE, 75 MG, 1 CAPSULE, ORALLY, TWICE DAILY, 30

          DAYS, 60

          NOTES: 61-YEAR-OLD FEMALE IN FOR CHRONIC PAIN FOLLOW-UP. GIVEN

          PRESENTING SYMPTOMS RECOMMEND ADDING LYRICA TO OXYCODONE REGIMEN

          WITH FOLLOW-UP IN ONE MONTH TO DETERMINE EFFICACY OF TREATMENT.

          PATIENT HAS EXPRESSED UNDERSTANDING OF AND WAS IN AGREEMENT WITH

          TREATMENT PLAN. GIVEN TIME TO ASK QUESTIONS AND EXPRESS CONCERNS.

           

          ISTOP REGISTRY REVIEWED AND DEMONSTRATES COMPLLIANCE. (REF

          #[id number] ) BRINGS IN MEDICATIONS WHICH IS APPROPRIATE FOR WHAT

          WAS DISPENSED. RECENT URINE TOXICOLOGY REVIEWED. NO UNAUTHORIZED

          MEDICATIONS. NO ILLICIT SUBSTANCES AND PRESCRIBED MEDICATIONS

          WERE PRESENT.

           

           

      CHRONIC PRESCRIPTION OPIATE USE

          LAB: [facility]

           

           

      OTHERS

          NOTES: PREGABALIN MATERIAL WAS PRINTED. PATIENT VERBALIZED AN

          UNDERSTANDING. SURINDER QUAN MA.

           

PROCEDURE CODES

           

           ESTABILISHED PATIENT Located within [facility] CHARGE

           

DISPOSITION & COMMUNICATION

           

FOLLOW UP

           

          4 WEEKS (REASON: NEW MED)

           

 

ELECTRONICALLY SIGNED BY MONA EM ON

          2021 AT 12:59 PM EDT

           

           

           

 

DISCLAIMER :

THIS IS A VISIT SUMMARY EXTRACTED FROM THE [employer] CHART.

IT IS NOT A COPY OF THE [employer] PROGRESS NOTE.

MTDD

## 2021-07-03 NOTE — ECWPNPC
PATIENT NAME: SYL ALCANTAR

: 1960

GENDER: FEMALE

MRN: [id number]

VISIT DATE: 2021

DISCHARGE DATE: 21 1113

VISIT LOCKED DATE TIME: 

PHYSICIAN: PAOLA WILKES 

PHYSICIAN PAGER NO: ACTIVE

RESOURCE: PAOLA WILKES 

 

           

           

REASON FOR APPOINTMENT

           

          1. NEW MEDICATION

           

HISTORY OF PRESENT ILLNESS

           

      GENERAL:

      HPI

          61-YEAR-OLD FEMALE IN FOR CHRONIC PAIN FOLLOW-UP. SHE RATES HER

          PAIN CURRENTLY AT A 6 OUT OF 10 AND DESCRIBES IT AS STABBING AND

          TINGLING. PATIENT WAS STARTED ON TIZANIDINE AT LAST CLINIC VISIT

          AND SHE ADMITS TODAY THAT THE LYRICA WAS MORE BENEFICIAL THAN THE

          TIZANIDINE AND SHE WOULD LIKE TO RESTART THE LYRICA..

           

           -.

           

      FALL RISK SCREENING:

      SCREENING

          : NO FALLS REPORTED IN THE LAST YEAR.

           

      PAIN SCREENING:

      PATIENT HAS A COMPLAINT OF ACUTE OR CHRONIC PAIN

           

           

          :YES

          LOCATION OF PAIN:LOW BACK

          INTENSITY OF PAIN (SCALE OF 1 TO 10):6

          WHAT DOES YOUR PAIN FEEL LIKE:STABBING, OTHER TINGLING

          DURATION:INTERMITTENT

          PAIN IS INCREASED BY:ACTIVITIES, PROLONGED STANDING

          PAIN IS DECREASED BY:SITTING, OTHERS REPOSITIONING

           

      NURSING NOTE:

           -.

           

      PAIN CENTER INTAKE QUESTIONS:

      DO YOU HAVE A HISTORY OF MRSA?

           

           

          :NO

           

      DO YOU TAKE A BLOOD THINNERS?

           

           

          :YES ELOQUIS

           

      DO YOU HAVE ANY BLEEDING DISORDERS?

           

           

          :NO

           

      ANY NEW NUMBNESS OR WEAKNESS IN YOUR LEGS OR ARMS?

           

           

          :NO

           

      ANY PACEMAKER,DEFIBRILLATOR, OR DORSAL COLUMN

          STIMULATOR?

           

           

          :NO

           

      DO YOU HAVE ANY RASHES OR OPEN SORES?

           

           

          :NO

           

      ARE YOU ALLERGIC TO IV DYE?

           

           

          :NO

           

      ARE YOU DIABETIC?

           

           

          :YES

           

      ANY NEW PROBLEMS WITH YOUR MEDICATIONS?

           

           

          :NO

           

      HAVE YOU RECEIVED A VACCINE IN THE PAST 30 DAYS?

           

           

          :NO

           

      DO YOU PLAN TO RECEIVE A VACCINE IN THE NEXT 21

          DAYS?

           

           

          :YES UNSURE

           

      DO YOU NEED ANY PRESCRIPTION?

           

           

          :YES PERCOCET

           

      DO YOU TAKE ANY IMMUNOSUPPRESSIVE MEDICATIONS?

           

           

          :NO

           

      DO YOU HAVE ANY KIDNEY OR LIVER DISEASE?

           

           

          :NO

           

      IS THERE A CHANCE YOU COULD BE PREGNANT?

           

           

          :NO

           

      ARE YOU BREAST FEEDING?

           

           

          :NO

           

CURRENT MEDICATIONS

           

          TAKING AMLODIPINE BESYLATE 5 MG TABLET 1 TABLET ORALLY DAILY

          TAKING ELIQUIS 5 MG TABLET 1 CAP ORALLY TWICE DAILY, NOTES: BLOOD

          THINNER

          TAKING LINZESS 145 MCG CAPSULE 1 CAPSULE AT LEAST 30 MINUTES

          BEFORE THE FIRST MEAL OF THE DAY ON AN EMPTY STOMACH ORALLY ONCE

          A DAY

          TAKING OXYCODONE HCL 5 MG TABLET 1 TABLET AS NEEDED ORALLY EVERY

          8 HRS AS NEEDED MDD 3

          TAKING TIZANIDINE HCL 4 MG TABLET 1 TABLET AS NEEDED ORALLY THREE

          TIMES A DAY

          NOT-TAKING LYRICA 75 MG CAPSULE 1 CAPSULE ORALLY TWICE DAILY

          MEDICATION LIST REVIEWED AND RECONCILED WITH THE PATIENT

           

PAST MEDICAL HISTORY

           

          LOW BACK PAIN

          MITRAL VALVE

          GASTRIC BYPASS 

          UMBILICAL HERNIA 

          CHOLECYCSTITIS 

          SICKLE CELL ANEMIA

          ASTHMA

          DIABETES MELLITUS TYPE II

           

ALLERGIES

           

          DARVON: HEART PALPATATIONS - SIDE EFFECTS

           

SOCIAL HISTORY

           

          GENERAL:

           

          TOBACCO USE

          ARE YOU A:NONSMOKER

           

           

          LATEX QUESTIONNAIRE

          LATEX ALLERGY : HAVE YOU EVER DEVELOPED ANY TYPE OF REACTION

          AFTER HANDLING LATEX PRODUCTS SUCH AS RUBBER GLOVES, CONDOMS,

          DIAPHRAGMS, BALLOONS, SOCKS, OR UNDERWEAR?NO

          LATEX ALLERGY : HAVE YOU EVER DEVELOPED ANY TYPE OF REACTION

          DURING OR AFTER DENTAL APPOINTMENT, VAGINAL/RECTAL EXAMINATION,

          SURGICAL PROCEDURE, OR ANY OTHER EXPOSURE?NO

          LATEX RISK : HAVE YOU EVER HAD ANY DIFFICULTY BREATHING OR HIVES

          AFTER EATING OR HANDLING ANY FRUITS, OR VEGETABLES; SUCH AS KIWI,

          BANANAS, STONE FRUITS, OR CHESTNUTSNO

          LATEX RISK : DO YOU HAVE A PREVIOUS PERSONAL HISTORY OF MORE THAN

          NINE SURGERIES, SPINA BIFIDA, OR REPEATED CATHERIZATIONS? NO

          LATEX RISK : ARE YOU FREQUENTLY EXPOSED TO LATEX PRODUCTS IN YOUR

          OCCUPATION?NO

          DATE ASKED : 2021

           

           

          ALCOHOL USE: NO.

           

           

          ALCOHOL SCREENING

          DID YOU HAVE A DRINK CONTAINING ALCOHOL IN THE PAST YEAR?NO

          POINTS0

          INTERPRETATIONNEGATIVE

           

           

          RECREATIONAL DRUG USE

          DRUG USE?NO

           

           

          CAFFEINE

          CAFFEINE USE?NO

           

           

          LANGUAGE

          LANGUAGES SPOKEN:[place]ian ENGLISH

           

           

          EDUCATION

          LEVEL OF EDUCATION:NOT FINISHED HIGH SCHOOL

           

           

          LEARNING BARRIERS / SPECIAL NEEDS

          CHANGE FROM LAST VISIT?NO

          BARRIERS TO LEARNING?NO

          HEARING IMPAIRED?YES

          VISION IMPAIRED?YES

          :CORRECTIVE LENSES

          COGNITIVELY IMPAIRED?NO

          READINESS TO LEARN?YES

          LEARNING PREFERENCES?NO

          LEARNING CAPABILITIES PRESENT?YES

          EMOTIONAL BARRIERS?NO

          SPECIAL DEVICES?YES

          :CANE, WALKER

           NEEDED?YES OCCASIONALLY

           

REVIEW OF SYSTEMS

           

      CONSTITUTIONAL:

           

          ANY RECENT FEVER    NO . CHILLS    NO . WEIGHT CHANGE OF UNKNOWN

          REASONS    NO .

           

      GASTROENTEROLOGY:

           

          NEW UNEXPLAINABLE CHANGES IN BOWEL CONTROL    NO . CONSTIPATION  

           NO .

           

      GENITOURINARY:

           

          ANY NEW CHANGE IN BLADDER CONTROL?    NO .

           

      NEUROLOGY:

           

          NEW ONSET DIZZINESS OR NEUROLOGICAL CHANGES NOT MENTIONED    NO .

          NEW NUMBNESS OR PAIN PATTERNS NOT MENTIONED AND PERTINENT TO

          TODAY'S VISIT    NO .

           

      CARDIOLOGY:

           

          NEW CHEST PRESSURE    NO . PATIENT DENIES    NO .

           

      RESPIRATORY:

           

          UNEXPLAINABLE COUGH    NO . NEW SHORTNESS OF BREATH    NO .

           

VITAL SIGNS

           

          .4 LBS, HT 67 IN, BMI 29.82 INDEX, /89 MM HG, REPEAT

          /86 MANUAL, HR 62 /MIN, RR 18 /MIN, TEMP 98.8 F, OXYGEN SAT

          % 100%, SAFE IN ENV? (Y/N) YES, REVIEWED BY: GRACE BP RETAKEN.

          SURINDER QUAN MA.

           

EXAMINATION

           

      GENERAL EXAMINATION:

          GENERALNO ACUTE DISTRESS, WELL NOURISHED AND HYDRATED.

           

          PSYCHAPPROPRIATE MOOD AND AFFECT .

           

          LUNGS:CLEAR TO AUSCULTATION BILATERALLY, NO WHEEZES, RHONCHI,

          RALES.

           

          HEART:NO MURMURS, REGULAR RATE AND RHYTHM.

           

ASSESSMENTS

           

          LOW BACK PAIN, UNSPECIFIED BACK PAIN LATERALITY, UNSPECIFIED

          CHRONICITY, UNSPECIFIED WHETHER SCIATICA PRESENT - M54.5

          (PRIMARY)

           

TREATMENT

           

      LOW BACK PAIN, UNSPECIFIED BACK PAIN LATERALITY,

          UNSPECIFIED CHRONICITY, UNSPECIFIED WHETHER SCIATICA PRESENT

          STOP OXYCODONE HCL TABLET, 5 MG, 1 TABLET AS NEEDED, ORALLY,

          EVERY 8 HRS AS NEEDED MDD 3

          REFILL LYRICA CAPSULE, 75 MG, 1 CAPSULE, ORALLY, TWICE DAILY, 90

          DAY(S), 180

          START PERCOCET TABLET, 5-325 MG, 1 TABLET AS NEEDED, ORALLY,

          EVERY 8 HRS PRN PAIN, 30 DAY(S), 90

          NOTES: 61-YEAR-OLD FEMALE IN FOR CHRONIC PAIN FOLLOW-UP. GIVEN

          PRESENTING SYMPTOMS RECOMMEND STARTING PERCOCET INSTEAD OF

          OXYCODONE AS PATIENT STATES THE OXYCODONE ALONE GIVES HER STOMACH

          UPSET AND SHE HAS TAKEN THE PERCOCET BEFORE WITHOUT ISSUE.

          FURTHER RECOMMEND RESTARTING LYRICA 75 MG TWICE DAILY WITH

          FOLLOW-UP IN 2 MONTHS. PATIENT HAS EXPRESSED UNDERSTANDING OF AND

          WAS IN AGREEMENT WITH TREATMENT PLAN. GIVEN TIME TO ASK QUESTIONS

          AND EXPRESS CONCERNS.

           

          ISTOP REGISTRY REVIEWED AND DEMONSTRATES COMPLLIANCE. (REF

          #[id number] ) BRINGS IN MEDICATIONS WHICH IS APPROPRIATE FOR WHAT

          WAS DISPENSED. RECENT URINE TOXICOLOGY REVIEWED. NO UNAUTHORIZED

          MEDICATIONS. NO ILLICIT SUBSTANCES AND PRESCRIBED MEDICATIONS

          WERE PRESENT.

           

PROCEDURE CODES

           

           ESTABILISHED PATIENT [facility] FACILITY CHARGE

           

DISPOSITION & COMMUNICATION

           

FOLLOW UP

           

          2 MONTHS (REASON: BACK PAIN )

           

 

ELECTRONICALLY SIGNED BY MONA EM ON

          2021 AT 08:27 AM EDT

           

           

           

 

DISCLAIMER :

THIS IS A VISIT SUMMARY EXTRACTED FROM THE CHORD CHART.

IT IS NOT A COPY OF THE CHORD PROGRESS NOTE.

LUCY

## 2021-10-28 NOTE — REP
INDICATION:

PAIN IN RIGHT ANKLE AND JOINTS OF RIGHT FOOT.



COMPARISON:

None.



TECHNIQUE:

Four views



FINDINGS:

There is a tiny lucency seen involving the very tip of the medial malleolus.  There

are no other osseous abnormalities.  There is no evidence of soft tissue swelling.

The mortise is intact



IMPRESSION:

Possible minimal tiny fracture at the very tip of the medial malleolus.  Correlate

clinically for point tenderness about this region.





<Electronically signed by Bishop Reina > 10/28/21 [id number]

## 2021-11-09 NOTE — REPVR
PROCEDURE INFORMATION: 

Exam: CT Head Without Contrast 

Exam date and time: 11/9/2021 9:42 AM 

Age: 61 years old 

Clinical indication: Pain; Headache; Additional info: S/P fall ? bleed / pelvic 

pain ovarion cyst 



TECHNIQUE: 

Imaging protocol: Computed tomography of the head without contrast. 

Radiation optimization: All CT scans at this facility use at least one of these 

dose optimization techniques: automated exposure control; mA and/or kV 

adjustment per patient size (includes targeted exams where dose is matched to 

clinical indication); or iterative reconstruction. 



COMPARISON: 

CT Head without contrast 10/6/2020 2:50 PM 



FINDINGS: 

Brain: There is no acute intracranial hemorrhage. No extra-axial fluid 

collection. No evidence of acute infarct. Gray white differentiation is intact. 

There is no evidence of mass. There is no mass effect or midline shift. 

Cerebral ventricles: No ventriculomegaly. 

Paranasal sinuses: Visualized sinuses are unremarkable. No fluid levels. 

Mastoid air cells:  No significant mastoid effusion. 

Bones/joints:  No acute fracture. 

Soft tissues: Unremarkable as visualized. 



IMPRESSION: 

No evidence of acute intracranial abnormality. No acute hemorrhage. No evidence 

of acute infarct or mass. 



Electronically signed by: Lakeisha Sinha On 11/09/2021  09:52:05 AM

## 2021-11-09 NOTE — REP
INDICATION:

S/P FALL ? BLEED / PELVIC PAIN OVARION CYST.



COMPARISON:

None.



TECHNIQUE:

Transabdominal and transvaginal scanning.



FINDINGS:

The urinary bladder is empty.



The uterus measures 6 x 3.4 x 4.9 cm.  The parenchymal echo pattern is within normal

limits.  There is a sliver of fluid in the endometrial cavity.  The endometrial

complex is heterogenous with a maximal thickness of 6 mm.



Neither ovary could be visualized transabdominally or trans vaginally.  There is no

evidence of an adnexal mass or free fluid in the pelvis.  Incidental note is made of

nabothian cysts.



IMPRESSION:

1. Heterogenous mildly thickened endometrial echo complex with a sliver of free fluid

in the endometrial cavity.

2. Exam limitations as described above.





<Electronically signed by Bishop Reina > 11/09/21 [id number]